# Patient Record
Sex: FEMALE | Race: WHITE
[De-identification: names, ages, dates, MRNs, and addresses within clinical notes are randomized per-mention and may not be internally consistent; named-entity substitution may affect disease eponyms.]

---

## 2017-07-28 ENCOUNTER — HOSPITAL ENCOUNTER (EMERGENCY)
Dept: HOSPITAL 74 - JERFT | Age: 22
Discharge: HOME | End: 2017-07-28
Payer: COMMERCIAL

## 2017-07-28 VITALS — DIASTOLIC BLOOD PRESSURE: 72 MMHG | HEART RATE: 80 BPM | SYSTOLIC BLOOD PRESSURE: 116 MMHG | TEMPERATURE: 98.2 F

## 2017-07-28 VITALS — BODY MASS INDEX: 19.5 KG/M2

## 2017-07-28 DIAGNOSIS — J02.9: Primary | ICD-10-CM

## 2017-07-28 DIAGNOSIS — J45.909: ICD-10-CM

## 2017-07-28 NOTE — PDOC
History of Present Illness





- General


Chief Complaint: Sore Throat


Stated Complaint: SORE THROAT


Time Seen by Provider: 07/28/17 11:59





- History of Present Illness


Initial Comments: 


07/28/17 12:19


CHIEF COMPLAINT: sore throat





HISTORY OF PRESENT ILLNESS: 20 yo F with hx of asthma and recurrent strep 

infections presents to fast [facility] with sore throat x 2 days.  Patient denies 

any coughing, sneezing, runny nose, or fever.  She reports pain with swallowing 

but denies any change in voice or difficulty breathing.  She states that this 

"feels like all her previous strep infections.





PAST MEDICAL HISTORY: Denies past medical history





FAMILY HISTORY: Denies





SOCIAL HISTORY: Denies tobacco, alcohol, illicit drug use. 





SURGICAL HISTORY: Denies





ALLERGIES: No known drug allergies





REVIEW OF SYSTEMS


General/Constitutional: Denies fever or chills. Denies weakness, weight change.





HEENT: Throat pain x 2 days. 





Cardiovascular: Denies chest pain or shortness of breath.





Respiratory: Denies cough, wheezing, or hemoptysis.





Gastrointestinal: Denies nausea, vomiting, diarrhea or constipation. Denies 

rectal bleeding.





Genitourinary: Denies dysuria, frequency, or change in urination.





Musculoskeletal: Denies joint or muscle swelling or pain. Denies neck or back 

pain.





Skin and breasts: Denies rash or easy bruising.





Neurologic: Denies headache, vertigo, loss of consciousness, or loss of 

sensation.





PHYSICAL EXAM


General Appearance: Well-appearing, appropriately dressed.  No apparent distress

, no intoxication.





HEENT: Tonsils 2+ with exudate bilaterally. EOMI, PERRLA, normal voice, TMs 

normal


.  No conjunctival pallor.  No photophobia, scleral icterus.





Respiratory/Chest: Lungs CTAB. 





Cardiovascular: RRR. S1, S2. 





Musculoskeletal/Extremities:  Normal inspection. FROM of all extremities, 

normal capillary refill.  Pelvis Stable.  No CVA tenderness. No tenderness to 

extremities, pedal edema, swelling, erythema or deformity.





Integumentary: Appropriate color, dry, warm.  No cyanosis, erythema, jaundice 

or rash





Neurologic: CNs II-XII intact. Fully oriented, alert.  Appropriate mood/affect. 

Motor strength 5/5.  No appreciable EOM palsy, facial droop or sensory deficit.





07/28/17 13:30








Past History





- Past Medical History


Allergies/Adverse Reactions: 


 Allergies











Allergy/AdvReac Type Severity Reaction Status Date / Time


 


No Known Allergies Allergy   Verified 07/28/17 11:51











Home Medications: 


Ambulatory Orders





Amoxicillin - [Amoxicillin 500mg Capsule -] 500 mg PO BID #14 capsule 07/28/17 


Beclomethasone Dipropionate [Qvar] 8.7 gm IH ASDIR 07/28/17 








Asthma: Yes


Suicide Attempt (Hx): No





- Immunization History


Immunization Up to Date: Yes





- Psycho/Social/Smoking Cessation Hx


Anxiety: No


Suicidal Ideation: No


Smoking Status: No


Smoking History: Never smoked


Number of Cigarettes Smoked Daily: 0


Cigars Per Day: 0


Hx Alcohol Use: No


Drug/Substance Use Hx: No





*Physical Exam





- Vital Signs


 Last Vital Signs











Temp Pulse Resp BP Pulse Ox


 


 98.2 F   80   19   116/72   98 


 


 07/28/17 11:51  07/28/17 11:51  07/28/17 11:51  07/28/17 11:51  07/28/17 11:51














Medical Decision Making





- Medical Decision Making


07/28/17 12:26


20 yo F with hx of asthma and recurrent strep infections presents to [facility] 

with sore throat x 2 days.  





-rapid strep








*DC/Admit/Observation/Transfer


Diagnosis at time of Disposition: 


Pharyngitis


Qualifiers:


 Pharyngitis/tonsillitis etiology: unspecified etiology Qualified Code(s): 

J02.9 - Acute pharyngitis, unspecified





- Discharge Dispostion


Disposition: HOME


Condition at time of disposition: Stable





- Prescriptions


Prescriptions: 


Amoxicillin - [Amoxicillin 500mg Capsule -] 500 mg PO BID #14 capsule





- Referrals


Referrals: 


Yaw Guerin [Primary Care Provider] - 





- Patient Instructions


Printed Discharge Instructions:  DI for Pharyngitis/Tonsillopharyngitis -- Adult


Additional Instructions: 


Please take medications as prescribed.  Please call us in 3 days for your 

throat culture results.  You may want to see an ENT doctor for your recurrent 

strep infections for a possible tonsillectomy.  If you experience any severe 

swelling of the tongue, throat, mouth, or develop difficulty speaking or 

breathing, or develop any worsening symptoms, please return to the ER.

## 2018-12-27 ENCOUNTER — HOSPITAL ENCOUNTER (EMERGENCY)
Dept: HOSPITAL 74 - JER | Age: 23
Discharge: HOME | End: 2018-12-27
Payer: COMMERCIAL

## 2018-12-27 VITALS — BODY MASS INDEX: 21.2 KG/M2

## 2018-12-27 VITALS — TEMPERATURE: 98 F

## 2018-12-27 VITALS — HEART RATE: 107 BPM | DIASTOLIC BLOOD PRESSURE: 55 MMHG | SYSTOLIC BLOOD PRESSURE: 110 MMHG

## 2018-12-27 DIAGNOSIS — J45.21: Primary | ICD-10-CM

## 2018-12-27 PROCEDURE — 3E0F7GC INTRODUCTION OF OTHER THERAPEUTIC SUBSTANCE INTO RESPIRATORY TRACT, VIA NATURAL OR ARTIFICIAL OPENING: ICD-10-PCS | Performed by: EMERGENCY MEDICINE

## 2018-12-27 RX ADMIN — IPRATROPIUM BROMIDE AND ALBUTEROL SULFATE SCH AMP: .5; 3 SOLUTION RESPIRATORY (INHALATION) at 12:45

## 2018-12-27 RX ADMIN — IPRATROPIUM BROMIDE AND ALBUTEROL SULFATE SCH AMP: .5; 3 SOLUTION RESPIRATORY (INHALATION) at 11:52

## 2018-12-27 RX ADMIN — IPRATROPIUM BROMIDE AND ALBUTEROL SULFATE SCH AMP: .5; 3 SOLUTION RESPIRATORY (INHALATION) at 11:51

## 2018-12-27 NOTE — PDOC
History of Present Illness





- General


Chief Complaint: Asthma


Stated Complaint: ASTHMA


Time Seen by Provider: 12/27/18 11:30


History Source: Patient


Exam Limitations: No Limitations





- History of Present Illness


Initial Comments: 





12/27/18 11:47


24 y/o female presents to the ED with c/o coughing, intermittent sob with 

coughing episodes x 3 days, and now with bilateral rib pain since this am worse 

with coughing and deep breathing. The pt states asthma since age 5 with 

multiple hospitalization without intubations. The patient states has been using 

her inhaler and nebulizer with minimal improvment of symptoms. The patient is 

followed by a pulmonologist at [facility] but was unable to get appt today. Pt has no 

c/o leg pain, swelling, smoking hx, recent sx/travel, exogenous estrogen usage, 

or clotting disorders. 


Timing/Duration: reports: other (3 days)


Severity: reports: moderate


Possible Cause: Yes: occasional episodes


Modifying Factors: improves with: albuterol inhaler, albuterol nebulizer, 

coughing


Associated Symptoms: reports: cough





Past History





- Travel


Traveled outside of the country in the last 30 days: No





- Past Medical History


Allergies/Adverse Reactions: 


 Allergies











Allergy/AdvReac Type Severity Reaction Status Date / Time


 


No Known Allergies Allergy   Verified 12/27/18 10:39











Home Medications: 


Ambulatory Orders





Albuterol 0.083% Nebulizer Sol [Ventolin 0.083%] 1 neb NEB Q4H PRN 12/27/18 








Asthma: Yes


COPD: No





- Immunization History


Immunization Up to Date: Yes





- Suicide/Smoking/Psychosocial Hx


Smoking Status: No


Smoking History: Never smoked


Number of Cigarettes Smoked Daily: 0


Cigars Per Day: 0


Information on smoking cessation initiated: No


Hx Alcohol Use: No


Drug/Substance Use Hx: No


Patient Lives Alone: No


Lives with/in: parents





**Review of Systems





- Review of Systems


Able to Perform ROS?: Yes


Constitutional: No: Symptoms Reported


HEENTM: No: Symptoms Reported


Respiratory: Yes: Cough.  No: Shortness of Breath


Cardiac (ROS): No: Symptoms Reported


ABD/GI: No: Symptoms Reported


Musculoskeletal: No: Symptoms Reported


Integumentary: No: Symptoms Reported


Neurological: No: Symptoms reported





*Physical Exam





- Vital Signs


 Last Vital Signs











Temp Pulse Resp BP Pulse Ox


 


 98.0 F   97 H  18   124/81   99 


 


 12/27/18 10:40  12/27/18 10:40  12/27/18 10:40  12/27/18 10:40  12/27/18 11:05














- Physical Exam


General Appearance: Yes: Nourished, Appropriately Dressed.  No: Apparent 

Distress


HEENT: positive: EOMI, THI, TMs Normal, Pharynx Normal.  negative: Pale 

Conjunctivae


Neck: positive: Supple


Respiratory/Chest: positive: Lungs Clear, Normal Breath Sounds.  negative: 

Respiratory Distress, Accessory Muscle Use, Wheezing


Cardiovascular: positive: Regular Rhythm, Regular Rate.  negative: Murmur


Gastrointestinal/Abdominal: positive: Soft.  negative: Tenderness


Integumentary: positive: Normal Color, Warm, Moist


Neurologic: positive: Motor Strength 5/5 (ambulatory)





Moderate Sedation





- Procedure Monitoring


Vital Signs: 


Procedure Monitoring Vital Signs











Temperature  98.0 F   12/27/18 10:40


 


Pulse Rate  97 H  12/27/18 10:40


 


Respiratory Rate  18   12/27/18 10:40


 


Blood Pressure  124/81   12/27/18 10:40


 


O2 Sat by Pulse Oximetry (%)  99   12/27/18 11:05











ED Treatment Course





- RADIOLOGY


Radiology Studies Ordered: 














 Category Date Time Status


 


 CHEST PA & LAT [RAD] Stat Radiology  12/27/18 11:45 Ordered














Medical Decision Making





- Medical Decision Making





12/27/18 12:00


CC: sob, cough, rib pain x 3 days, hx [place]ham, sees a specialist at [facility]


Exam: LCTA, no wheezing, reproducible jeffrey rib pain


Plan: douneb x 2, prednisone po, and then cxr, finish w/ 3rd duoneb


12/27/18 12:54


cxr -. pt states feeling better and has not had a coughing episode since 

arrival. Pt will be discharged home with prednisone po. 





*DC/Admit/Observation/Transfer


Diagnosis at time of Disposition: 


Asthma exacerbation


Qualifiers:


 Asthma severity: mild Asthma persistence: intermittent Qualified Code(s): 

J45.21 - Mild intermittent asthma with (acute) exacerbation








- Discharge Dispostion


Disposition: HOME


Condition at time of disposition: Improved





- Referrals





- Patient Instructions


Printed Discharge Instructions:  Asthma -- Adult


Additional Instructions: 


Please use inhaler as needed.


Take Prednisone as prescribed starting tomorrow. 


Avoid going from inside to outside without covering you mouth/nose with a scarf 

to prevent bronchospasms. 





- Post Discharge Activity

## 2019-09-26 ENCOUNTER — TRANSCRIPTION ENCOUNTER (OUTPATIENT)
Age: 24
End: 2019-09-26

## 2019-10-07 ENCOUNTER — APPOINTMENT (OUTPATIENT)
Dept: INTERNAL MEDICINE | Facility: CLINIC | Age: 24
End: 2019-10-07

## 2020-08-03 ENCOUNTER — TRANSCRIPTION ENCOUNTER (OUTPATIENT)
Age: 25
End: 2020-08-03

## 2021-07-25 ENCOUNTER — HOSPITAL ENCOUNTER (EMERGENCY)
Dept: HOSPITAL 74 - JERFT | Age: 26
Discharge: HOME | End: 2021-07-25
Payer: COMMERCIAL

## 2021-07-25 VITALS — HEART RATE: 88 BPM | TEMPERATURE: 97.8 F | DIASTOLIC BLOOD PRESSURE: 70 MMHG | SYSTOLIC BLOOD PRESSURE: 119 MMHG

## 2021-07-25 VITALS — BODY MASS INDEX: 21.2 KG/M2

## 2021-07-25 DIAGNOSIS — K13.0: Primary | ICD-10-CM

## 2022-01-25 ENCOUNTER — TRANSCRIPTION ENCOUNTER (OUTPATIENT)
Age: 27
End: 2022-01-25

## 2022-03-08 ENCOUNTER — TRANSCRIPTION ENCOUNTER (OUTPATIENT)
Age: 27
End: 2022-03-08

## 2023-09-26 ENCOUNTER — NON-APPOINTMENT (OUTPATIENT)
Age: 28
End: 2023-09-26

## 2024-02-01 ENCOUNTER — NON-APPOINTMENT (OUTPATIENT)
Age: 29
End: 2024-02-01

## 2025-01-17 ENCOUNTER — NON-APPOINTMENT (OUTPATIENT)
Age: 30
End: 2025-01-17